# Patient Record
Sex: MALE
[De-identification: names, ages, dates, MRNs, and addresses within clinical notes are randomized per-mention and may not be internally consistent; named-entity substitution may affect disease eponyms.]

---

## 2024-09-27 ENCOUNTER — P2P PATIENT RECORD (OUTPATIENT)
Age: 24
End: 2024-09-27

## 2024-10-10 ENCOUNTER — OFFICE VISIT (OUTPATIENT)
Dept: URBAN - METROPOLITAN AREA CLINIC 25 | Facility: CLINIC | Age: 24
End: 2024-10-10
Payer: COMMERCIAL

## 2024-10-10 ENCOUNTER — DASHBOARD ENCOUNTERS (OUTPATIENT)
Age: 24
End: 2024-10-10

## 2024-10-10 VITALS
HEART RATE: 69 BPM | BODY MASS INDEX: 30.09 KG/M2 | SYSTOLIC BLOOD PRESSURE: 112 MMHG | TEMPERATURE: 98.1 F | WEIGHT: 227 LBS | DIASTOLIC BLOOD PRESSURE: 78 MMHG | HEIGHT: 73 IN

## 2024-10-10 DIAGNOSIS — K59.04 CHRONIC IDIOPATHIC CONSTIPATION: ICD-10-CM

## 2024-10-10 DIAGNOSIS — R19.4 CHANGE IN BOWEL HABITS: ICD-10-CM

## 2024-10-10 PROBLEM — 82934008: Status: ACTIVE | Noted: 2024-10-10

## 2024-10-10 PROCEDURE — 99204 OFFICE O/P NEW MOD 45 MIN: CPT

## 2024-10-10 RX ORDER — LINACLOTIDE 145 UG/1
TAKE 1 CAPSULE BY MOUTH ONCE DAILY CAPSULE, GELATIN COATED ORAL
Qty: 30 EACH | Refills: 0 | Status: ACTIVE | COMMUNITY

## 2024-10-10 NOTE — HPI-TODAY'S VISIT:
10/24 OV  - Patient w/ learning disability, brother and mother present providing history  Mr. Lora is a 24y M, he presents today w/ complaints of long-standing chronic constipation, present since his childhood, the patient has a BM once every 2 weeks, currently taking a regimen of linzess 145mcg for one week w/ little efficacy, patient has also failed miralax BID. Mineral oil helped the most, in the past mag citrate had helped as well, no regimen has been successful in sustaining regular bowel habits. [place] stool type 1, occasionally 6/7 after a long period of time. No hx of colonoscopy denies famhx of colon CA/polyps, SOB/CP, Denies cardiac hardware, dialysis, blood thinner use, and or issues with anesthesia

## 2024-10-29 ENCOUNTER — OFFICE VISIT (OUTPATIENT)
Dept: URBAN - METROPOLITAN AREA SURGERY CENTER 20 | Facility: SURGERY CENTER | Age: 24
End: 2024-10-29

## 2024-10-29 RX ORDER — LINACLOTIDE 145 UG/1
TAKE 1 CAPSULE BY MOUTH ONCE DAILY CAPSULE, GELATIN COATED ORAL
Qty: 30 EACH | Refills: 0 | Status: ACTIVE | COMMUNITY

## 2024-12-12 ENCOUNTER — OFFICE VISIT (OUTPATIENT)
Dept: URBAN - METROPOLITAN AREA CLINIC 82 | Facility: CLINIC | Age: 24
End: 2024-12-12

## 2024-12-13 ENCOUNTER — OFFICE VISIT (OUTPATIENT)
Dept: URBAN - METROPOLITAN AREA CLINIC 25 | Facility: CLINIC | Age: 24
End: 2024-12-13

## 2024-12-20 ENCOUNTER — OFFICE VISIT (OUTPATIENT)
Dept: URBAN - METROPOLITAN AREA CLINIC 25 | Facility: CLINIC | Age: 24
End: 2024-12-20
Payer: COMMERCIAL

## 2024-12-20 VITALS
TEMPERATURE: 98.1 F | WEIGHT: 227.8 LBS | HEIGHT: 73 IN | DIASTOLIC BLOOD PRESSURE: 78 MMHG | BODY MASS INDEX: 30.19 KG/M2 | SYSTOLIC BLOOD PRESSURE: 113 MMHG | HEART RATE: 88 BPM

## 2024-12-20 DIAGNOSIS — K59.04 CHRONIC IDIOPATHIC CONSTIPATION: ICD-10-CM

## 2024-12-20 PROCEDURE — 99214 OFFICE O/P EST MOD 30 MIN: CPT

## 2024-12-20 RX ORDER — LINACLOTIDE 145 UG/1
TAKE 1 CAPSULE BY MOUTH ONCE DAILY CAPSULE, GELATIN COATED ORAL
Qty: 30 EACH | Refills: 0 | Status: ACTIVE | COMMUNITY

## 2024-12-20 RX ORDER — LINACLOTIDE 145 UG/1
1 CAPSULE AT LEAST 30 MINUTES BEFORE THE FIRST MEAL OF THE DAY ON AN EMPTY STOMACH CAPSULE, GELATIN COATED ORAL ONCE A DAY
Qty: 90 | Refills: 3 | OUTPATIENT
Start: 2024-12-20 | End: 2025-12-15

## 2024-12-20 NOTE — HPI-OTHER HISTORIES
10/24 OV  - Patient w/ learning disability, brother and mother present providing history  Mr. Lora is a 24y M, he presents today w/ complaints of long-standing chronic constipation, present since his childhood, the patient has a BM once every 2 weeks, currently taking a regimen of linzess 145mcg for one week w/ little efficacy, patient has also failed miralax BID. Mineral oil helped the most, in the past mag citrate had helped as well, no regimen has been successful in sustaining regular bowel habits. [place] stool type 1, occasionally 6/7 after a long period of time. No hx of colonoscopy denies famhx of colon CA/polyps, SOB/CP, Denies cardiac hardware, dialysis, blood thinner use, and or issues with anesthesia

## 2024-12-20 NOTE — HPI-TODAY'S VISIT:
12/24 OV  S/p colonoscopy which revealed cecal diverticulosis and internal hemorrhoids, patient's mother reports that patient is having a BM 2-3 x a week, is currently on a regimen of Miralax and linzess, admits to large quantity stools, no hard stools, no diarrhea, stools are mushy in consistency. [place] BRBPR, melena, unintentional weight loss, N/V, SOB/CP    Colon 2024  Preparation of the colon was fair. The entire examined colon is normal. Diverticulosis in the cecum. Internal non-bleeding hemorrhoids. No specimens collected.

## 2025-03-21 ENCOUNTER — OFFICE VISIT (OUTPATIENT)
Dept: URBAN - METROPOLITAN AREA CLINIC 25 | Facility: CLINIC | Age: 25
End: 2025-03-21

## 2025-03-26 ENCOUNTER — OFFICE VISIT (OUTPATIENT)
Dept: URBAN - METROPOLITAN AREA CLINIC 25 | Facility: CLINIC | Age: 25
End: 2025-03-26
Payer: COMMERCIAL

## 2025-03-26 DIAGNOSIS — K59.04 CHRONIC IDIOPATHIC CONSTIPATION: ICD-10-CM

## 2025-03-26 DIAGNOSIS — R19.4 CHANGE IN BOWEL HABITS: ICD-10-CM

## 2025-03-26 PROCEDURE — 99213 OFFICE O/P EST LOW 20 MIN: CPT

## 2025-03-26 RX ORDER — LINACLOTIDE 145 UG/1
1 CAPSULE AT LEAST 30 MINUTES BEFORE THE FIRST MEAL OF THE DAY ON AN EMPTY STOMACH CAPSULE, GELATIN COATED ORAL ONCE A DAY
Qty: 90 | Refills: 3 | Status: ACTIVE | COMMUNITY
Start: 2024-12-20 | End: 2025-12-15

## 2025-03-26 NOTE — HPI-OTHER HISTORIES
12/24 OV  S/p colonoscopy which revealed cecal diverticulosis and internal hemorrhoids, patient's mother reports that patient is having a BM 2-3 x a week, is currently on a regimen of Miralax and linzess, admits to large quantity stools, no hard stools, no diarrhea, stools are mushy in consistency. [place] BRBPR, melena, unintentional weight loss, N/V, SOB/CP    Colon 2024  Preparation of the colon was fair. The entire examined colon is normal. Diverticulosis in the cecum. Internal non-bleeding hemorrhoids. No specimens collected.  10/24 OV  - Patient w/ learning disability, brother and mother present providing history  Mr. Lora is a 24y M, he presents today w/ complaints of long-standing chronic constipation, present since his childhood, the patient has a BM once every 2 weeks, currently taking a regimen of linzess 145mcg for one week w/ little efficacy, patient has also failed miralax BID. Mineral oil helped the most, in the past mag citrate had helped as well, no regimen has been successful in sustaining regular bowel habits. [place] stool type 1, occasionally 6/7 after a long period of time. No hx of colonoscopy denies famhx of colon CA/polyps, SOB/CP, Denies cardiac hardware, dialysis, blood thinner use, and or issues with anesthesia

## 2025-03-26 NOTE — HPI-TODAY'S VISIT:
03/25 OV  Patient taking Linzess 145mcg OD w/ occasional supplementation w/ Miralax, currently he is having a BM every other day, bowels are more complete. [place] stool 3-4, no associated BRBPR, melena, unintentional wt loss, N/V, He denies SOB/CP